# Patient Record
Sex: FEMALE | Race: ASIAN | NOT HISPANIC OR LATINO | URBAN - METROPOLITAN AREA
[De-identification: names, ages, dates, MRNs, and addresses within clinical notes are randomized per-mention and may not be internally consistent; named-entity substitution may affect disease eponyms.]

---

## 2017-02-03 ENCOUNTER — INPATIENT (INPATIENT)
Facility: HOSPITAL | Age: 52
LOS: 14 days | Discharge: NURSING, REHAB OR LONG-TERM CARE | DRG: 64 | End: 2017-02-18
Attending: PSYCHIATRY & NEUROLOGY | Admitting: PSYCHIATRY & NEUROLOGY
Payer: MEDICAID

## 2017-02-03 VITALS
SYSTOLIC BLOOD PRESSURE: 130 MMHG | OXYGEN SATURATION: 100 % | HEART RATE: 80 BPM | RESPIRATION RATE: 18 BRPM | DIASTOLIC BLOOD PRESSURE: 60 MMHG

## 2017-02-03 DIAGNOSIS — I62.9 NONTRAUMATIC INTRACRANIAL HEMORRHAGE, UNSPECIFIED: ICD-10-CM

## 2017-02-03 LAB
APPEARANCE UR: CLEAR — SIGNIFICANT CHANGE UP
APTT BLD: 30.6 SEC — SIGNIFICANT CHANGE UP (ref 27.5–37.4)
BASOPHILS # BLD AUTO: 0 K/UL — SIGNIFICANT CHANGE UP (ref 0–0.2)
BASOPHILS NFR BLD AUTO: 0.1 % — SIGNIFICANT CHANGE UP (ref 0–2)
BILIRUB UR-MCNC: NEGATIVE — SIGNIFICANT CHANGE UP
COLOR SPEC: SIGNIFICANT CHANGE UP
DIFF PNL FLD: NEGATIVE — SIGNIFICANT CHANGE UP
EOSINOPHIL # BLD AUTO: 0 K/UL — SIGNIFICANT CHANGE UP (ref 0–0.5)
EOSINOPHIL NFR BLD AUTO: 0.3 % — SIGNIFICANT CHANGE UP (ref 0–6)
EPI CELLS # UR: SIGNIFICANT CHANGE UP /HPF
GAS PNL BLDV: SIGNIFICANT CHANGE UP
GLUCOSE UR QL: NEGATIVE — SIGNIFICANT CHANGE UP
HCT VFR BLD CALC: 43.4 % — SIGNIFICANT CHANGE UP (ref 34.5–45)
HGB BLD-MCNC: 14.9 G/DL — SIGNIFICANT CHANGE UP (ref 11.5–15.5)
INR BLD: 0.94 RATIO — SIGNIFICANT CHANGE UP (ref 0.88–1.16)
KETONES UR-MCNC: NEGATIVE — SIGNIFICANT CHANGE UP
LEUKOCYTE ESTERASE UR-ACNC: NEGATIVE — SIGNIFICANT CHANGE UP
LYMPHOCYTES # BLD AUTO: 1 K/UL — SIGNIFICANT CHANGE UP (ref 1–3.3)
LYMPHOCYTES # BLD AUTO: 7.9 % — LOW (ref 13–44)
MCHC RBC-ENTMCNC: 32.8 PG — SIGNIFICANT CHANGE UP (ref 27–34)
MCHC RBC-ENTMCNC: 34.4 GM/DL — SIGNIFICANT CHANGE UP (ref 32–36)
MCV RBC AUTO: 95.2 FL — SIGNIFICANT CHANGE UP (ref 80–100)
MONOCYTES # BLD AUTO: 0.4 K/UL — SIGNIFICANT CHANGE UP (ref 0–0.9)
MONOCYTES NFR BLD AUTO: 3.5 % — SIGNIFICANT CHANGE UP (ref 2–14)
NEUTROPHILS # BLD AUTO: 10.9 K/UL — HIGH (ref 1.8–7.4)
NEUTROPHILS NFR BLD AUTO: 88.2 % — HIGH (ref 43–77)
NITRITE UR-MCNC: NEGATIVE — SIGNIFICANT CHANGE UP
PH UR: 7.5 — SIGNIFICANT CHANGE UP (ref 4.8–8)
PLATELET # BLD AUTO: 145 K/UL — LOW (ref 150–400)
PROT UR-MCNC: SIGNIFICANT CHANGE UP
PROTHROM AB SERPL-ACNC: 10.2 SEC — SIGNIFICANT CHANGE UP (ref 10–13.1)
RBC # BLD: 4.56 M/UL — SIGNIFICANT CHANGE UP (ref 3.8–5.2)
RBC # FLD: 12.1 % — SIGNIFICANT CHANGE UP (ref 10.3–14.5)
RBC CASTS # UR COMP ASSIST: SIGNIFICANT CHANGE UP /HPF (ref 0–2)
SP GR SPEC: 1.01 — SIGNIFICANT CHANGE UP (ref 1.01–1.02)
UROBILINOGEN FLD QL: NEGATIVE — SIGNIFICANT CHANGE UP
WBC # BLD: 12.4 K/UL — HIGH (ref 3.8–10.5)
WBC # FLD AUTO: 12.4 K/UL — HIGH (ref 3.8–10.5)
WBC UR QL: SIGNIFICANT CHANGE UP /HPF (ref 0–5)

## 2017-02-03 PROCEDURE — 99291 CRITICAL CARE FIRST HOUR: CPT | Mod: 25

## 2017-02-03 PROCEDURE — 70496 CT ANGIOGRAPHY HEAD: CPT | Mod: 26

## 2017-02-03 PROCEDURE — 93010 ELECTROCARDIOGRAM REPORT: CPT

## 2017-02-03 RX ORDER — LEVETIRACETAM 250 MG/1
1000 TABLET, FILM COATED ORAL ONCE
Qty: 0 | Refills: 0 | Status: COMPLETED | OUTPATIENT
Start: 2017-02-03 | End: 2017-02-03

## 2017-02-03 RX ORDER — ONDANSETRON 8 MG/1
4 TABLET, FILM COATED ORAL ONCE
Qty: 0 | Refills: 0 | Status: COMPLETED | OUTPATIENT
Start: 2017-02-03 | End: 2017-02-03

## 2017-02-03 RX ORDER — ATORVASTATIN CALCIUM 80 MG/1
80 TABLET, FILM COATED ORAL AT BEDTIME
Qty: 0 | Refills: 0 | Status: DISCONTINUED | OUTPATIENT
Start: 2017-02-03 | End: 2017-02-03

## 2017-02-03 RX ORDER — NICARDIPINE HYDROCHLORIDE 30 MG/1
5 CAPSULE, EXTENDED RELEASE ORAL
Qty: 50 | Refills: 0 | Status: DISCONTINUED | OUTPATIENT
Start: 2017-02-03 | End: 2017-02-04

## 2017-02-03 RX ADMIN — ONDANSETRON 4 MILLIGRAM(S): 8 TABLET, FILM COATED ORAL at 18:45

## 2017-02-03 RX ADMIN — LEVETIRACETAM 400 MILLIGRAM(S): 250 TABLET, FILM COATED ORAL at 16:36

## 2017-02-03 RX ADMIN — NICARDIPINE HYDROCHLORIDE 25 MG/HR: 30 CAPSULE, EXTENDED RELEASE ORAL at 16:36

## 2017-02-03 NOTE — ED PROVIDER NOTE - CRITICAL CARE PROVIDED
consultation with other physicians/interpretation of diagnostic studies/direct patient care (not related to procedure)/additional history taking/documentation/consult w/ pt's family directly relating to pts condition

## 2017-02-03 NOTE — ED ADULT NURSE REASSESSMENT NOTE - NS ED NURSE REASSESS COMMENT FT1
Pt attempting to climb out of bed / disorientated. MD aware. Pt placed on 1;1 to maintain pts safety.

## 2017-02-03 NOTE — H&P ADULT. - HISTORY OF PRESENT ILLNESS
50 yearold woman no PMH transferred from [facility] with a Left basal ganglia hemorrhage. As per coworker at bedside, patient came to work today and was found to have slurred speech and aphasic. when she arrived at [facility] she was found to have Rt sided weakness. Was placed on a cardiene drip for SBP >200. No recent trauma reported. Coworker states that patient normally speaks fluent English but is now not making sense. NIHSS-15, MRS-0, ICH score on admission- 1    ROS- negative except above.

## 2017-02-03 NOTE — ED ADULT NURSE NOTE - OBJECTIVE STATEMENT
51 y.o female transfer from [place] c L temporal hematoma. Pt last seen normal last night- went to dinner c work colleagues. Showed up to work this morning when slurred speech was noticed as per EMS. Pt is bilingual as per pts work colleagues. Pt has incoherent speech upon interview. Pupils +6 B/L reactive. PADILLA- responds to painful stimuli. 51 y.o female transfer from [place] c L temporal hematoma. Pt last seen normal last night- went to dinner c work colleagues. Showed up to work this morning when slurred speech was noticed as per EMS. Pt is bilingual as per pts work colleagues. Pt has incoherent speech upon interview. Pupils +6 B/L reactive. PADILLA- responds to painful stimuli. Pt arrived c Cardene gtt infusing- 5mg/hr. Pt had arrived to [place] ED c systolic 200+. No facial dropp noted. Pt smiles when asked. Work collegues at bedside. Skin intact. No vomiting at this time. Cardene to be continued as per MDs orders. Neuro surg paged/ at bedside. 51 y.o female transfer from [place] c "L temporal hematoma". Pt last seen normal last night- went to dinner c work colleagues. Showed up to work this morning when slurred speech was noticed as per EMS. Pt is bilingual as per pts work colleagues. Pt alert c incoherent speech upon interview. Pupils +6 B/L reactive. PADILLA- responds to painful stimuli. Pt arrived c Cardene gtt infusing- 5mg/hr. Pt had arrived to [place] ED c systolic 200+. No facial droop noted. Pt smiles when asked. Work colleagues at bedside. Skin intact. No vomiting at this time. Cardene to be continued as per MDs orders. Neuro surg paged/ at bedside.

## 2017-02-03 NOTE — ED PROVIDER NOTE - OBJECTIVE STATEMENT
51 no known PMH transferred from [facility] for L temporal hematoma. Patient was with coworkers last night for dinner and then arrived to work today with slurred speech. When she arrived to the hospital she had R sided deficit and was aphasic. Placed on a cardene drip for SBP >200. Upon arrival patient aphasic, R sided deficits, but eye open and alert.

## 2017-02-03 NOTE — H&P ADULT. - ATTENDING COMMENTS
Ms. Hartman is a 51 year old woman no known PMH transferred from [facility] with a Left basal ganglia hemorrhage. As per coworker at bedside, patient came to work today and was found to have slurred speech  and was not making sense. When she arrived at [facility] she was found to have Rt sided weakness. Was placed on a cardiene drip for SBP >200. No recent trauma reported. Coworker states that patient normally speaks fluent English but is now not making sense. NIHSS-15, MRS-0, ICH score on admission- 1  on neurological exam she has significant expressive aphasia she is able to follow simple commands, she makes several paraphasic errors speech is fluent however nonsensical. on motor exam she has right hemiparesis facial droop  Impression: Nontraumatic left hemispheric basal ganglia hemorrhage, unclear etiology ? Hypertension; however needs complete workup including MRI brain with and without contrast to rule out vascular lesions.

## 2017-02-03 NOTE — ED ADULT NURSE NOTE - ED STAT RN HANDOFF DETAILS
Report given to Jenna. Pt awaiting bed assignment on stroke unit. Remains on 1:1 to maintain safety.

## 2017-02-03 NOTE — H&P ADULT. - MOTOR
0/5 RUE, 5/5 LUE, Moves LE spontaneously but cannot assess for drift (patient not cooperating with exam)

## 2017-02-03 NOTE — ED PROVIDER NOTE - MEDICAL DECISION MAKING DETAILS
Jayden: 52 yo F with no PMHx transferred from [facility] for L temporal hematoma p/w slurred speech (expressive aphasia), RUE and RLE hemiparesis and confusion. Pt sent to [facility] on cardene drip.   -monitor, neuro checks q1 hr, ecg, labs, CTA head, stat NS and neuro consult, admission

## 2017-02-03 NOTE — H&P ADULT. - ASSESSMENT
52 yearold woman no PMH transferred from [facility] for left sided ICH. Was found to have SBP >200 in OSH, was put on cardiene drip. Most likely from hypertensive urgency

## 2017-02-03 NOTE — H&P ADULT. - PROBLEM SELECTOR PLAN 1
[]CTA H/N  []MRI brain w and w/o con  []Neuro checks q2 hours  []repeat CTH in AM  []MAP <130, SBP<140 []CTA H/N  []MRI brain w and w/o con  []Neuro checks q2 hours  []NPO; dysphagia screening  []PT/OT/SS  []repeat CTH in AM  []MAP <130, SBP<140

## 2017-02-04 LAB
ANION GAP SERPL CALC-SCNC: 15 MMOL/L — SIGNIFICANT CHANGE UP (ref 5–17)
BUN SERPL-MCNC: 8 MG/DL — SIGNIFICANT CHANGE UP (ref 7–23)
CALCIUM SERPL-MCNC: 9.3 MG/DL — SIGNIFICANT CHANGE UP (ref 8.4–10.5)
CHLORIDE SERPL-SCNC: 101 MMOL/L — SIGNIFICANT CHANGE UP (ref 96–108)
CHOLEST SERPL-MCNC: 212 MG/DL — HIGH (ref 10–199)
CO2 SERPL-SCNC: 21 MMOL/L — LOW (ref 22–31)
CREAT SERPL-MCNC: 0.52 MG/DL — SIGNIFICANT CHANGE UP (ref 0.5–1.3)
GLUCOSE SERPL-MCNC: 111 MG/DL — HIGH (ref 70–99)
HBA1C BLD-MCNC: 5.4 % — SIGNIFICANT CHANGE UP (ref 4–5.6)
HCT VFR BLD CALC: 41.7 % — SIGNIFICANT CHANGE UP (ref 34.5–45)
HDLC SERPL-MCNC: 83 MG/DL — SIGNIFICANT CHANGE UP (ref 40–125)
HGB BLD-MCNC: 14.2 G/DL — SIGNIFICANT CHANGE UP (ref 11.5–15.5)
LIPID PNL WITH DIRECT LDL SERPL: 121 MG/DL — SIGNIFICANT CHANGE UP
MCHC RBC-ENTMCNC: 32.6 PG — SIGNIFICANT CHANGE UP (ref 27–34)
MCHC RBC-ENTMCNC: 34.1 GM/DL — SIGNIFICANT CHANGE UP (ref 32–36)
MCV RBC AUTO: 95.4 FL — SIGNIFICANT CHANGE UP (ref 80–100)
PLATELET # BLD AUTO: 158 K/UL — SIGNIFICANT CHANGE UP (ref 150–400)
POTASSIUM SERPL-MCNC: 3.7 MMOL/L — SIGNIFICANT CHANGE UP (ref 3.5–5.3)
POTASSIUM SERPL-SCNC: 3.7 MMOL/L — SIGNIFICANT CHANGE UP (ref 3.5–5.3)
RBC # BLD: 4.37 M/UL — SIGNIFICANT CHANGE UP (ref 3.8–5.2)
RBC # FLD: 12.1 % — SIGNIFICANT CHANGE UP (ref 10.3–14.5)
SODIUM SERPL-SCNC: 137 MMOL/L — SIGNIFICANT CHANGE UP (ref 135–145)
TOTAL CHOLESTEROL/HDL RATIO MEASUREMENT: 2.6 RATIO — LOW (ref 3.3–7.1)
TRIGL SERPL-MCNC: 42 MG/DL — SIGNIFICANT CHANGE UP (ref 10–149)
WBC # BLD: 12.2 K/UL — HIGH (ref 3.8–10.5)
WBC # FLD AUTO: 12.2 K/UL — HIGH (ref 3.8–10.5)

## 2017-02-04 PROCEDURE — 93306 TTE W/DOPPLER COMPLETE: CPT | Mod: 26

## 2017-02-04 PROCEDURE — 70551 MRI BRAIN STEM W/O DYE: CPT | Mod: 26

## 2017-02-04 PROCEDURE — 99223 1ST HOSP IP/OBS HIGH 75: CPT

## 2017-02-04 PROCEDURE — 70450 CT HEAD/BRAIN W/O DYE: CPT | Mod: 26

## 2017-02-04 RX ORDER — HYDRALAZINE HCL 50 MG
5 TABLET ORAL EVERY 8 HOURS
Qty: 0 | Refills: 0 | Status: DISCONTINUED | OUTPATIENT
Start: 2017-02-04 | End: 2017-02-06

## 2017-02-04 NOTE — DISCHARGE NOTE ADULT - PATIENT PORTAL LINK FT
“You can access the FollowHealth Patient Portal, offered by [facility], by registering with the following website: [email or web address]”

## 2017-02-04 NOTE — PHYSICAL THERAPY INITIAL EVALUATION ADULT - FOLLOWS COMMANDS/ANSWERS QUESTIONS, REHAB EVAL
follows demonstration better than verbal commands/50% of the time/able to follow single-step instructions

## 2017-02-04 NOTE — PATIENT PROFILE ADULT. - LANGUAGE ASSISTANCE NEEDED
pt speaks and understand English/No-Patient/Caregiver offered and refused free interpretation services.

## 2017-02-04 NOTE — DISCHARGE NOTE ADULT - CARE PROVIDERS DIRECT ADDRESSES
[email or web address]

## 2017-02-04 NOTE — DISCHARGE NOTE ADULT - CARE PLAN
Principal Discharge DX:	Intracranial hemorrhage  Goal:	prevention  Instructions for follow-up, activity and diet:	C/w current medications

## 2017-02-04 NOTE — DISCHARGE NOTE ADULT - OTHER SIGNIFICANT FINDINGS
There is redemonstration of a superior left temporal/inferior left   frontoparietal intraparenchymal hemorrhage measuring 4.3 x 3.6 cm. There   is surrounding vasogenic edema with mass effect upon the lateral   ventricle and left to right shift of 3 mm. No intraventricular hemorrhage   is identified. There is no hydrocephalus. There is no acute infarct.    Flow voids of the major intracranial vessels at the skull base follow   expected course and contour.    There is a right maxillary polyp/retention cyst with near complete   opacification of the right maxillary sinus. The orbits, sellar and   suprasellar structures, and craniocervical junction are unremarkable.

## 2017-02-04 NOTE — DISCHARGE NOTE ADULT - HOSPITAL COURSE
50 yearold woman no PMH transferred from [facility] with a Left basal ganglia hemorrhage. As per coworker at bedside, patient came to work today and was found to have slurred speech and aphasic. when she arrived at [facility] she was found to have Rt sided weakness. Was placed on a cardiene drip for SBP >200  Patient was admitted to the stroke unit for observation and work up. Work up included MRI Brain demonstrating eft inferior frontotemporal parietal/superior   temporal intraparenchymal hemorrhage with surrounding vasogenic edema and   3 mm of left-to-right midline shift.  , vessel imaging via MRA head/neck demonstrating no stenosis . Etiology of patient's symptoms of weakness  were secondary to L hemispheric hemmorhage likely 2/2 uncontrolled hypertension . Patient underwent blood pressure management, preventative care, and physical therapy recommending rehab during course of stay. Patient is now stable for follow up as outpatient in 2-4 weeks with Neurology Patient is to continue medications as directed for stroke prevention. 52 yearold woman no PMH transferred from [facility] with a Left basal ganglia hemorrhage. As per coworker at bedside, patient came to work today and was found to have slurred speech and aphasic. when she arrived at [facility] she was found to have Rt sided weakness. Was placed on a cardiene drip for SBP >200  Patient was admitted to the stroke unit for observation and work up. Work up included MRI Brain demonstrating eft inferior frontotemporal parietal/superior   temporal intraparenchymal hemorrhage with surrounding vasogenic edema and   3 mm of left-to-right midline shift. vessel imaging via MRA head/neck demonstrating no stenosis . Etiology of patient's symptoms of weakness  were secondary to L hemispheric hemmorhage likely 2/2 uncontrolled hypertension . Patient underwent blood pressure management, preventative care, and physical therapy recommending rehab during course of stay. Patient is now stable for follow up as outpatient in 2-4 weeks with Neurology Patient is to continue medications as directed for stroke prevention.

## 2017-02-04 NOTE — DISCHARGE NOTE ADULT - NS AS DC STROKE ED MATERIALS
Stroke Education Booklet/Risk Factors for Stroke/Prescribed Medications/Need for Followup After Discharge/Stroke Warning Signs and Symptoms/Call 911 for Stroke

## 2017-02-04 NOTE — DISCHARGE NOTE ADULT - MEDICATION SUMMARY - MEDICATIONS TO TAKE
I will START or STAY ON the medications listed below when I get home from the hospital:    lisinopril 5 mg oral tablet  -- 1 tab(s) by mouth once a day  -- Indication: For hypertension    metoprolol  -- 12.5 milligram(s) by mouth 2 times a day  -- Indication: For hypertension

## 2017-02-04 NOTE — PHYSICAL THERAPY INITIAL EVALUATION ADULT - PERTINENT HX OF CURRENT PROBLEM, REHAB EVAL
50 y/o F no PMH transferred from [facility] with a Left basal ganglia hemorrhage. As per coworker at bedside, patient came to work today and was found to have slurred speech and aphasia. NIHSS-15, MRS-0, ICH score on admission- 1.  CT angio: Left frontotemporal intraparenchymal hemorrhage with extension into the **cont below:

## 2017-02-04 NOTE — DISCHARGE NOTE ADULT - CARE PROVIDER_API CALL
Nick Zavala (ESE), Neurology; Vascular Neurology  [address] [place] [address]  Phone: [phone]  Fax: [phone]

## 2017-02-04 NOTE — PHYSICAL THERAPY INITIAL EVALUATION ADULT - ADDITIONAL COMMENTS
**cont from above: left posterior limb of the internal capsule, left subinsular cortex, and left centrum semiovale measuring approximately 4 x 3.9 x 3.9 cm, with surrounding edema and mass effect, and effacement of the left lateral third ventricle with left-to-right midline shift of 5 mm.    Pt lives alone in a house, no steps to negotiate.  Pt was independent with all functional mobility prior to admission, pt is a .  Pt friend at bedside pt does not have family here but has good support system from friends and co-workers.

## 2017-02-05 LAB
ANION GAP SERPL CALC-SCNC: 18 MMOL/L — HIGH (ref 5–17)
BUN SERPL-MCNC: 21 MG/DL — SIGNIFICANT CHANGE UP (ref 7–23)
CALCIUM SERPL-MCNC: 9.9 MG/DL — SIGNIFICANT CHANGE UP (ref 8.4–10.5)
CHLORIDE SERPL-SCNC: 101 MMOL/L — SIGNIFICANT CHANGE UP (ref 96–108)
CO2 SERPL-SCNC: 21 MMOL/L — LOW (ref 22–31)
CREAT SERPL-MCNC: 0.65 MG/DL — SIGNIFICANT CHANGE UP (ref 0.5–1.3)
GLUCOSE SERPL-MCNC: 94 MG/DL — SIGNIFICANT CHANGE UP (ref 70–99)
HCT VFR BLD CALC: 42.3 % — SIGNIFICANT CHANGE UP (ref 34.5–45)
HGB BLD-MCNC: 14.6 G/DL — SIGNIFICANT CHANGE UP (ref 11.5–15.5)
MCHC RBC-ENTMCNC: 33.1 PG — SIGNIFICANT CHANGE UP (ref 27–34)
MCHC RBC-ENTMCNC: 34.6 GM/DL — SIGNIFICANT CHANGE UP (ref 32–36)
MCV RBC AUTO: 95.8 FL — SIGNIFICANT CHANGE UP (ref 80–100)
PLATELET # BLD AUTO: 169 K/UL — SIGNIFICANT CHANGE UP (ref 150–400)
POTASSIUM SERPL-MCNC: 3.3 MMOL/L — LOW (ref 3.5–5.3)
POTASSIUM SERPL-SCNC: 3.3 MMOL/L — LOW (ref 3.5–5.3)
RBC # BLD: 4.42 M/UL — SIGNIFICANT CHANGE UP (ref 3.8–5.2)
RBC # FLD: 11.6 % — SIGNIFICANT CHANGE UP (ref 10.3–14.5)
SODIUM SERPL-SCNC: 140 MMOL/L — SIGNIFICANT CHANGE UP (ref 135–145)
WBC # BLD: 11.2 K/UL — HIGH (ref 3.8–10.5)
WBC # FLD AUTO: 11.2 K/UL — HIGH (ref 3.8–10.5)

## 2017-02-05 PROCEDURE — 99233 SBSQ HOSP IP/OBS HIGH 50: CPT

## 2017-02-05 PROCEDURE — 70553 MRI BRAIN STEM W/O & W/DYE: CPT | Mod: 26

## 2017-02-05 RX ORDER — METOPROLOL TARTRATE 50 MG
5 TABLET ORAL EVERY 8 HOURS
Qty: 0 | Refills: 0 | Status: DISCONTINUED | OUTPATIENT
Start: 2017-02-05 | End: 2017-02-06

## 2017-02-05 RX ORDER — ENOXAPARIN SODIUM 100 MG/ML
40 INJECTION SUBCUTANEOUS DAILY
Qty: 0 | Refills: 0 | Status: DISCONTINUED | OUTPATIENT
Start: 2017-02-05 | End: 2017-02-18

## 2017-02-05 RX ORDER — POTASSIUM CHLORIDE 20 MEQ
10 PACKET (EA) ORAL
Qty: 0 | Refills: 0 | Status: COMPLETED | OUTPATIENT
Start: 2017-02-05 | End: 2017-02-05

## 2017-02-05 RX ADMIN — Medication 100 MILLIEQUIVALENT(S): at 06:54

## 2017-02-05 RX ADMIN — Medication 5 MILLIGRAM(S): at 21:58

## 2017-02-05 RX ADMIN — Medication 100 MILLIEQUIVALENT(S): at 10:05

## 2017-02-05 RX ADMIN — Medication 100 MILLIEQUIVALENT(S): at 08:00

## 2017-02-05 RX ADMIN — Medication 5 MILLIGRAM(S): at 03:15

## 2017-02-05 RX ADMIN — ENOXAPARIN SODIUM 40 MILLIGRAM(S): 100 INJECTION SUBCUTANEOUS at 12:58

## 2017-02-05 RX ADMIN — Medication 5 MILLIGRAM(S): at 14:20

## 2017-02-05 NOTE — SPEECH LANGUAGE PATHOLOGY EVALUATION - SLP EXECUTIVE FUNCTION DEFICITS NOTED
planning/insight/awareness/self-correction/self-monitoring/impulsivity/mental flexibility/organization/inhibition

## 2017-02-05 NOTE — SWALLOW BEDSIDE ASSESSMENT ADULT - SWALLOW EVAL: DIAGNOSIS
Pt presents with oropharyngeal dysphagia primarily characterized by s/s of aspiration on thin liquids. Pt also presents with cognitive linguistic deficits.   Solids not presented given reduced oral awareness formation, control and transfer of the bolus.  Right sided facial weakness noted with mildly reduced labial/lingual ROM and strength.  Pt presents with fluent aphasia in both Mandarin/English, intermixed with jargon.

## 2017-02-05 NOTE — SPEECH LANGUAGE PATHOLOGY EVALUATION - SLP DIAGNOSIS
Pt presents with fluent aphasia characterized by jargon intermixed with true word as Pt switches between English/Mandarin.  Deficits noted in receptive/expressive language skills in addition to executive function/underling cognitive deficits.  Vocal intensity is reduced with increased rate of speech resulting in poor to fair speech intelligibility when true word/phrase is produced. Pt presents with fluent aphasia characterized by jargon intermixed with true word as Pt switches between English/Mandarin.  Deficits noted in receptive/expressive language skills in addition to executive function/underling cognitive deficits.  Vocal intensity is reduced.  In addition, dysarthria characterized by increased rate of speech resulting in poor to fair speech intelligibility when true word/phrase is produced.  Pt gestures spontaneously but does not consistently answer y/n questions or follow commands.  Pt is easily distracted with reduced attention to speaker. Pt presents with fluent aphasia characterized by jargon intermixed with true word as Pt appears to switch between English/Mandarin languages.  Deficits noted in receptive/expressive language skills in addition to executive function/underling cognitive deficits.  Vocal intensity is reduced.  Dysarthria characterized by poor to fair speech intelligibility when true word/phrase is produced.  Pt gestures spontaneously but does not consistently answer y/n questions or follow commands.  Pt is easily distracted with reduced attention to speaker. Pt presents with fluent aphasia characterized by jargon intermixed with true word as Pt appears to switch between English/Mandarin languages.  Deficits noted in receptive/expressive language skills in addition to executive function/underling cognitive deficits suspected.  Vocal intensity is reduced.  Dysarthria characterized by poor to fair speech intelligibility when true word/phrase is produced.  Pt gestures spontaneously but does not consistently answer y/n questions or follow commands.  Pt is easily distracted with reduced attention to speaker.

## 2017-02-05 NOTE — SWALLOW BEDSIDE ASSESSMENT ADULT - CONSISTENCIES ADMINISTERED
nectar thick/puree thin/puree thick thin liquid solids not administered given reduced oral awareness and weakness (R>L)

## 2017-02-05 NOTE — SPEECH LANGUAGE PATHOLOGY EVALUATION - SLP FLUENCY
Pt's speech is fluent although aphasic errors/jargon noted as Pt speaks while interchanging English/Mandarin languages.  Pt's friend at b/s served as  and had frequent difficulty discriminating utterances.  To this clinician utterances sounded like Mandarin vs jargon.

## 2017-02-05 NOTE — OCCUPATIONAL THERAPY INITIAL EVALUATION ADULT - GENERAL OBSERVATIONS, REHAB EVAL
Pt received supine in bed, +cardiac monitor, +pulse ox, +alert however pt continuously going back and forth btwn Mandarin and English therefore not oriented to situation . Friends at bedside assist with translation

## 2017-02-05 NOTE — OCCUPATIONAL THERAPY INITIAL EVALUATION ADULT - MANUAL MUSCLE TESTING RESULTS, REHAB EVAL
LUE: 3/5, LLE: 3/5, RUE: proximal to distal shoulder to wrist 2/5, unable to extend wrist, RLE: hip and knee 3/5, R ankle 2/5 unable to perform plantarflexion/grossly assessed due to

## 2017-02-05 NOTE — OCCUPATIONAL THERAPY INITIAL EVALUATION ADULT - ORIENTATION, REHAB EVAL
person/pt able to provide 1st name and mos of birth however pt continuously transferring back and forth between English and Mandarin therefore max verbal cues required to assess

## 2017-02-05 NOTE — OCCUPATIONAL THERAPY INITIAL EVALUATION ADULT - LIVES WITH, PROFILE
alone/Pt lives alone in 1st floor apartment, +2-3 steps to enter with b/l handrails, bedroom and tub shower located on one level. As per friends, pt does not have family in the [place]

## 2017-02-05 NOTE — SPEECH LANGUAGE PATHOLOGY EVALUATION - COMMENTS
NIHSS-15, MRS-0, ICH score on admission- 1; on neurological exam she has significant expressive aphasia she is able to follow simple commands, she makes several paraphasic errors speech is fluent however nonsensical. on motor exam she has right hemiparesis facial droop.  2/4/17 CT Head: IMPRESSION: No significant change in the left cerebral hemisphere intraparenchymal hemorrhage with surrounding edema and mass effect resulting in a stable rightward midline shift.  MRI IMPRESSION: Redemonstration of left inferior frontotemporal parietal/superior temporal intraparenchymal hemorrhage with surrounding vasogenic edema and 3 mm of left-to-right midline shift. Impression: Nontraumatic left hemispheric basal ganglia hemorrhage, unclear etiology ? Hypertension; however needs complete workup including MRI brain with and without contrast to rule out vascular lesions.. Pt is easily distracted with reduced attention to speaker and reduced awareness of communication breakdown.  Pt offered  phone for Mandarin but shook head no and pointed to friend at bedside; in addition Nsg reports unsuccessful attempts at use of  phone for Mandarin as the  was unable to hear Pt; Pt speaks with low vocal intensity; +dysarthria further impacts speech intelligibility; +jargon. Pt verbalized social greeting and was able to state profession/name with moderate cueing.  Pt evidenced significant difficulty in object naming task, requiring f=2 words to improve responses.  Pt gestured to demonstrate desire to drink although verbalizations were frequently unintelligible.  Pt stated pudding was "good". Pt responded to question (re: hunger) appropriately.  Pt followed some commands with visual cues.  Repetition of verbal stimuli and/or translation to Mandarin appeared to improve response to y/n questions.   Reduced attention and distractibility reduced functional communication skills. n/a Pt often perseverated gestures (ie pointing to right side of body) and required max cues to redirect to the task. Pt is easily distracted with reduced attention to speaker and reduced awareness of communication breakdown.  Pt offered  phone for Mandarin but shook head no and pointed to friend at bedside; in addition Nsg reports unsuccessful attempts at use of  phone for Mandarin as the  was unable to hear Pt; Pt speaks with low vocal intensity; +dysarthria further impacts speech intelligibility; +jargon with production of occasional true word/phrase.

## 2017-02-05 NOTE — SWALLOW BEDSIDE ASSESSMENT ADULT - PHARYNGEAL PHASE
No signs or symptoms of laryngeal penetration/aspiration evident with any consistency presented.  Pharyngeal swallow judged to be timely with adequate laryngeal elevation. Delayed cough post oral intake

## 2017-02-05 NOTE — OCCUPATIONAL THERAPY INITIAL EVALUATION ADULT - RANGE OF MOTION EXAMINATION, LOWER EXTREMITY
Right LE Active ROM was WFL   (within functional limits)/Left LE Active ROM was WFL (within functional limits)/R ankle PROM WFL

## 2017-02-05 NOTE — OCCUPATIONAL THERAPY INITIAL EVALUATION ADULT - ADDITIONAL COMMENTS
CT Brain 2/4: No significant change in the left cerebral hemisphere intraparenchymal hemorrhage with surrounding edema and mass effect resulting in a stable rightward midline shift. Brain MRI 2/4: Redemonstration of left inferior frontotemporal parietal/superior temporal intraparenchymal hemorrhage with surrounding vasogenic edema and 3 mm of left-to-right midline shift.   TTE 2/4/17:Normal aortic root size. (Ao: 3.3 cm at the sinuses of Valsalva). Dilated ascending aorta (4.4 cm).Moderate concentric left ventricular hypertrophy.Endocardium not well visualized; grossly normal left ventricular systolic function.Reversal of the E-A waves of the mitral inflow pattern consistent with reduced compliance of the left ventricle.The right ventricle is not well visualized; grossly normal right ventricular systolic function.No pericardial effusion seen.

## 2017-02-05 NOTE — OCCUPATIONAL THERAPY INITIAL EVALUATION ADULT - PERTINENT HX OF CURRENT PROBLEM, REHAB EVAL
52 yo F no PMH transferred from [facility] with a L basal ganglia hemorrhage. As per coworker at bedside, pt came to work today and found to have slurred speech and aphasic. when she arrived at [facility] she was found to have Rt sided weakness. Was placed on a cardiene drip for SBP >200. No recent trauma reported. Coworker states that patient normally speaks fluent English but is now not making sense. NIHSS-15, MRS-0, ICH score on admission- 1. ROS- negative except above

## 2017-02-05 NOTE — OCCUPATIONAL THERAPY INITIAL EVALUATION ADULT - PLANNED THERAPY INTERVENTIONS, OT EVAL
bed mobility training/fine motor coordination training/IADL retraining/ROM/neuromuscular re-education/ADL retraining/balance training/strengthening/transfer training/cognitive, visual perceptual/motor coordination training

## 2017-02-05 NOTE — OCCUPATIONAL THERAPY INITIAL EVALUATION ADULT - RANGE OF MOTION EXAMINATION, UPPER EXTREMITY
Left UE Active ROM was WFL (within functional limits)/RUE AROM shoulder flex:0-45, PROM WFL, R elbow AROM WFL, R wrist PROM WFL (ROM performed in supine)

## 2017-02-05 NOTE — SPEECH LANGUAGE PATHOLOGY EVALUATION - SLP PERTINENT HISTORY OF CURRENT PROBLEM
Ms. Hartman is a 51 year old woman no known PMH transferred from [facility] with a Left basal ganglia hemorrhage. As per coworker at bedside, patient came to work today and was found to have slurred speech  and was not making sense. When she arrived at [facility] she was found to have Rt sided weakness. Was placed on a cardiene drip for SBP >200. No recent trauma reported. Coworker states that patient normally speaks fluent English but is now not making sense.

## 2017-02-05 NOTE — OCCUPATIONAL THERAPY INITIAL EVALUATION ADULT - NS ASR FOLLOW COMMAND OT EVAL
required max verbal cues and visual demos 2* to language/able to follow single-step instructions/50% of the time

## 2017-02-05 NOTE — SWALLOW BEDSIDE ASSESSMENT ADULT - ORAL PREPARATORY PHASE
reduced oral awareness to residue on Right labial margin with purees; Pt impulsive with self feeding./Within functional limits suspect a-p spillage/Reduced oral grading

## 2017-02-06 LAB
ANION GAP SERPL CALC-SCNC: 13 MMOL/L — SIGNIFICANT CHANGE UP (ref 5–17)
BUN SERPL-MCNC: 15 MG/DL — SIGNIFICANT CHANGE UP (ref 7–23)
CALCIUM SERPL-MCNC: 9.1 MG/DL — SIGNIFICANT CHANGE UP (ref 8.4–10.5)
CHLORIDE SERPL-SCNC: 101 MMOL/L — SIGNIFICANT CHANGE UP (ref 96–108)
CO2 SERPL-SCNC: 22 MMOL/L — SIGNIFICANT CHANGE UP (ref 22–31)
CREAT SERPL-MCNC: 0.6 MG/DL — SIGNIFICANT CHANGE UP (ref 0.5–1.3)
GLUCOSE SERPL-MCNC: 103 MG/DL — HIGH (ref 70–99)
HCT VFR BLD CALC: 40.8 % — SIGNIFICANT CHANGE UP (ref 34.5–45)
HGB BLD-MCNC: 13.7 G/DL — SIGNIFICANT CHANGE UP (ref 11.5–15.5)
MCHC RBC-ENTMCNC: 32.2 PG — SIGNIFICANT CHANGE UP (ref 27–34)
MCHC RBC-ENTMCNC: 33.7 GM/DL — SIGNIFICANT CHANGE UP (ref 32–36)
MCV RBC AUTO: 95.5 FL — SIGNIFICANT CHANGE UP (ref 80–100)
PLATELET # BLD AUTO: 170 K/UL — SIGNIFICANT CHANGE UP (ref 150–400)
POTASSIUM SERPL-MCNC: 4.3 MMOL/L — SIGNIFICANT CHANGE UP (ref 3.5–5.3)
POTASSIUM SERPL-SCNC: 4.3 MMOL/L — SIGNIFICANT CHANGE UP (ref 3.5–5.3)
RBC # BLD: 4.27 M/UL — SIGNIFICANT CHANGE UP (ref 3.8–5.2)
RBC # FLD: 11.5 % — SIGNIFICANT CHANGE UP (ref 10.3–14.5)
SODIUM SERPL-SCNC: 136 MMOL/L — SIGNIFICANT CHANGE UP (ref 135–145)
WBC # BLD: 10.1 K/UL — SIGNIFICANT CHANGE UP (ref 3.8–10.5)
WBC # FLD AUTO: 10.1 K/UL — SIGNIFICANT CHANGE UP (ref 3.8–10.5)

## 2017-02-06 PROCEDURE — 99253 IP/OBS CNSLTJ NEW/EST LOW 45: CPT

## 2017-02-06 RX ORDER — LISINOPRIL 2.5 MG/1
5 TABLET ORAL DAILY
Qty: 0 | Refills: 0 | Status: DISCONTINUED | OUTPATIENT
Start: 2017-02-06 | End: 2017-02-18

## 2017-02-06 RX ORDER — LISINOPRIL 2.5 MG/1
1 TABLET ORAL
Qty: 0 | Refills: 0 | COMMUNITY
Start: 2017-02-06

## 2017-02-06 RX ORDER — METOPROLOL TARTRATE 50 MG
12.5 TABLET ORAL
Qty: 0 | Refills: 0 | Status: DISCONTINUED | OUTPATIENT
Start: 2017-02-06 | End: 2017-02-18

## 2017-02-06 RX ADMIN — Medication 5 MILLIGRAM(S): at 07:38

## 2017-02-06 RX ADMIN — Medication 12.5 MILLIGRAM(S): at 17:33

## 2017-02-06 RX ADMIN — ENOXAPARIN SODIUM 40 MILLIGRAM(S): 100 INJECTION SUBCUTANEOUS at 11:47

## 2017-02-06 RX ADMIN — LISINOPRIL 5 MILLIGRAM(S): 2.5 TABLET ORAL at 09:07

## 2017-02-06 NOTE — DIETITIAN INITIAL EVALUATION ADULT. - PROBLEM SELECTOR PLAN 1
[]CTA H/N  []MRI brain w and w/o con  []Neuro checks q2 hours  []NPO; dysphagia screening  []PT/OT/SS  []repeat CTH in AM  []MAP <130, SBP<140

## 2017-02-06 NOTE — DIETITIAN INITIAL EVALUATION ADULT. - OTHER INFO
Pt seen for: consult for chewing/swallowing difficulty   Adm dx: ICH    PMH: none    GI issues: no N/V/D   Last BM: none since adm   Food allergies: NKFA   Vit/supplement PTA: none noted

## 2017-02-06 NOTE — DIETITIAN INITIAL EVALUATION ADULT. - ENERGY NEEDS
Ht: 65"  Wt: 146.8  BMI:  24.5kg/m2   IBW:  125 (+/-10%)     117%IBW  Edema: none   Skin: no pressure injuries

## 2017-02-07 RX ORDER — METOPROLOL TARTRATE 50 MG
12.5 TABLET ORAL
Qty: 0 | Refills: 0 | COMMUNITY
Start: 2017-02-07

## 2017-02-07 RX ADMIN — ENOXAPARIN SODIUM 40 MILLIGRAM(S): 100 INJECTION SUBCUTANEOUS at 13:32

## 2017-02-07 RX ADMIN — Medication 12.5 MILLIGRAM(S): at 05:34

## 2017-02-07 RX ADMIN — Medication 12.5 MILLIGRAM(S): at 17:55

## 2017-02-07 RX ADMIN — LISINOPRIL 5 MILLIGRAM(S): 2.5 TABLET ORAL at 05:34

## 2017-02-08 RX ADMIN — Medication 12.5 MILLIGRAM(S): at 17:34

## 2017-02-08 RX ADMIN — LISINOPRIL 5 MILLIGRAM(S): 2.5 TABLET ORAL at 05:56

## 2017-02-08 RX ADMIN — ENOXAPARIN SODIUM 40 MILLIGRAM(S): 100 INJECTION SUBCUTANEOUS at 11:29

## 2017-02-08 RX ADMIN — Medication 12.5 MILLIGRAM(S): at 05:56

## 2017-02-08 NOTE — SWALLOW BEDSIDE ASSESSMENT ADULT - ORAL PREPARATORY PHASE
Within functional limits/adequate labial seal impulsive with cues to take small bites and to clear mouth prior to next intake.

## 2017-02-08 NOTE — SWALLOW BEDSIDE ASSESSMENT ADULT - ORAL PHASE
Within functional limits Delayed oral transit time/mild/Within functional limits/Decreased anterior-posterior movement of the bolus

## 2017-02-08 NOTE — SWALLOW BEDSIDE ASSESSMENT ADULT - SWALLOW EVAL: DIAGNOSIS
Pt presents with oropharyngeal dysphagia characterized by reduced oral awareness, mastication of solids and a-p transfer of the bolus.  Pt exhibited coughing post swallow on trials of solids which is deemed to be a s/s of aspiration.  Laryngeal elevation reduced.  No s/s of aspiration evident with thin liquids. Pt presents with oropharyngeal dysphagia characterized by reduced oral awareness, mastication of solids and a-p transfer of the bolus.  Pt exhibited coughing post swallow on trials of solids which is deemed to be a s/s of aspiration.  Laryngeal elevation reduced.  No s/s of aspiration evident with thin liquids.  Right sided facial asymmetry persists.  Speech intelligibility judged to be improving.

## 2017-02-08 NOTE — SWALLOW BEDSIDE ASSESSMENT ADULT - PHARYNGEAL PHASE
No signs or symptoms of laryngeal penetration/aspiration evident with any consistency presented.  Pharyngeal swallow judged to be timely ; clear vocal quality post swallow. Decreased laryngeal elevation/Cough post oral intake

## 2017-02-08 NOTE — SWALLOW BEDSIDE ASSESSMENT ADULT - SLP GENERAL OBSERVATIONS
Pt OOB in chair; aphasia with underlying cognitive deficits persist although Pt is speaking more in English; aphasic errors persist.

## 2017-02-09 RX ADMIN — Medication 12.5 MILLIGRAM(S): at 17:35

## 2017-02-09 RX ADMIN — ENOXAPARIN SODIUM 40 MILLIGRAM(S): 100 INJECTION SUBCUTANEOUS at 12:38

## 2017-02-09 RX ADMIN — Medication 12.5 MILLIGRAM(S): at 05:21

## 2017-02-09 RX ADMIN — LISINOPRIL 5 MILLIGRAM(S): 2.5 TABLET ORAL at 05:21

## 2017-02-10 RX ADMIN — LISINOPRIL 5 MILLIGRAM(S): 2.5 TABLET ORAL at 05:21

## 2017-02-10 RX ADMIN — ENOXAPARIN SODIUM 40 MILLIGRAM(S): 100 INJECTION SUBCUTANEOUS at 12:00

## 2017-02-10 RX ADMIN — Medication 12.5 MILLIGRAM(S): at 05:21

## 2017-02-10 RX ADMIN — Medication 12.5 MILLIGRAM(S): at 18:18

## 2017-02-11 PROCEDURE — 99233 SBSQ HOSP IP/OBS HIGH 50: CPT

## 2017-02-11 RX ADMIN — Medication 12.5 MILLIGRAM(S): at 05:55

## 2017-02-11 RX ADMIN — Medication 12.5 MILLIGRAM(S): at 18:49

## 2017-02-11 RX ADMIN — LISINOPRIL 5 MILLIGRAM(S): 2.5 TABLET ORAL at 05:55

## 2017-02-11 RX ADMIN — ENOXAPARIN SODIUM 40 MILLIGRAM(S): 100 INJECTION SUBCUTANEOUS at 12:12

## 2017-02-12 RX ADMIN — Medication 12.5 MILLIGRAM(S): at 17:01

## 2017-02-12 RX ADMIN — ENOXAPARIN SODIUM 40 MILLIGRAM(S): 100 INJECTION SUBCUTANEOUS at 12:07

## 2017-02-12 RX ADMIN — Medication 12.5 MILLIGRAM(S): at 05:14

## 2017-02-12 RX ADMIN — LISINOPRIL 5 MILLIGRAM(S): 2.5 TABLET ORAL at 05:15

## 2017-02-13 PROCEDURE — 99233 SBSQ HOSP IP/OBS HIGH 50: CPT

## 2017-02-13 RX ADMIN — ENOXAPARIN SODIUM 40 MILLIGRAM(S): 100 INJECTION SUBCUTANEOUS at 12:09

## 2017-02-13 RX ADMIN — Medication 12.5 MILLIGRAM(S): at 05:34

## 2017-02-13 RX ADMIN — Medication 12.5 MILLIGRAM(S): at 17:28

## 2017-02-13 RX ADMIN — LISINOPRIL 5 MILLIGRAM(S): 2.5 TABLET ORAL at 05:34

## 2017-02-13 NOTE — SWALLOW BEDSIDE ASSESSMENT ADULT - ASR SWALLOW LINGUAL MOBILITY
mildly reduced strength
mildly reduced strength
mild/impaired protrusion/impaired anterior elevation/impaired left lateral movement/impaired right lateral movement

## 2017-02-13 NOTE — SWALLOW BEDSIDE ASSESSMENT ADULT - SWALLOW EVAL: DIAGNOSIS
Pt presents with mild oral stage dysphagia as characterized by mildly delayed mastication and a-p transfer of bolus.  Overall skills judged to be WFL for the mechanical soft texture.   No signs or symptoms of laryngeal penetration/aspiration evident with any consistency presented.  Pharyngeal swallow judged to be timely with adequate laryngeal elevation.   Slight Left sided facial weakness persists.

## 2017-02-13 NOTE — SWALLOW BEDSIDE ASSESSMENT ADULT - ASR SWALLOW ASPIRATION MONITOR
fever/pneumonia/throat clearing/gurgly voice/change of breathing pattern/cough/upper respiratory infection/*If evident, report to MD immediately and d/c oral diet.
fever/pneumonia/upper respiratory infection/change of breathing pattern/throat clearing/gurgly voice/cough/*If evident, report to MD immediately and d/c oral diet.
cough/pneumonia/upper respiratory infection/*If evident, report to MD immediately and d/c oral diet./fever/change of breathing pattern/throat clearing/gurgly voice

## 2017-02-13 NOTE — SWALLOW BEDSIDE ASSESSMENT ADULT - ORAL PHASE
Within functional limits Within functional limits/Delayed oral transit time/mild; Pt spontaneously took sips of thin liquids to assist with oral clearing./Decreased anterior-posterior movement of the bolus

## 2017-02-13 NOTE — SWALLOW BEDSIDE ASSESSMENT ADULT - SPECIFY REASON(S)
to subjectively assess swallowing skills and r/o dysphagia.

## 2017-02-13 NOTE — SWALLOW BEDSIDE ASSESSMENT ADULT - COMMENTS
NIHSS-15, MRS-0, ICH score on admission- 1; on neurological exam she has significant expressive aphasia she is able to follow simple commands, she makes several paraphasic errors speech is fluent however nonsensical. on motor exam she has right hemiparesis facial droop.  2/4/17 CT Head: IMPRESSION: No significant change in the left cerebral hemisphere intraparenchymal hemorrhage with surrounding edema and mass effect resulting in a stable rightward midline shift.  MRI IMPRESSION: Redemonstration of left inferior frontotemporal parietal/superior temporal intraparenchymal hemorrhage with surrounding vasogenic edema and 3 mm of left-to-right midline shift. Impression: Nontraumatic left hemispheric basal ganglia hemorrhage, unclear etiology ? Hypertension; however needs complete workup including MRI brain with and without contrast to rule out vascular lesions..
NIHSS-15, MRS-0, ICH score on admission- 1; on neurological exam she has significant expressive aphasia she is able to follow simple commands, she makes several paraphasic errors speech is fluent however nonsensical. on motor exam she has right hemiparesis facial droop.  2/4/17 CT Head: IMPRESSION: No significant change in the left cerebral hemisphere intraparenchymal hemorrhage with surrounding edema and mass effect resulting in a stable rightward midline shift.  MRI IMPRESSION: Redemonstration of left inferior frontotemporal parietal/superior temporal intraparenchymal hemorrhage with surrounding vasogenic edema and 3 mm of left-to-right midline shift. Impression: Nontraumatic left hemispheric basal ganglia hemorrhage, unclear etiology ? Hypertension; however needs complete workup including MRI brain with and without contrast to rule out vascular lesions.
NIHSS-15, MRS-0, ICH score on admission- 1; on neurological exam she has significant expressive aphasia she is able to follow simple commands, she makes several paraphasic errors speech is fluent however nonsensical. on motor exam she has right hemiparesis facial droop.  2/4/17 CT Head: IMPRESSION: No significant change in the left cerebral hemisphere intraparenchymal hemorrhage with surrounding edema and mass effect resulting in a stable rightward midline shift.  MRI IMPRESSION: Redemonstration of left inferior frontotemporal parietal/superior temporal intraparenchymal hemorrhage with surrounding vasogenic edema and 3 mm of left-to-right midline shift. Impression: Nontraumatic left hemispheric basal ganglia hemorrhage, unclear etiology ? Hypertension; however needs complete workup including MRI brain with and without contrast to rule out vascular lesions.

## 2017-02-13 NOTE — SWALLOW BEDSIDE ASSESSMENT ADULT - PHARYNGEAL PHASE
No signs or symptoms of laryngeal penetration/aspiration evident with any consistency presented.  Pharyngeal swallow judged to be timely ; clear vocal quality post swallow. No signs or symptoms of laryngeal penetration/aspiration evident with any consistency presented.  Pharyngeal swallow judged to be timely with adequate laryngeal elevation.

## 2017-02-13 NOTE — SWALLOW BEDSIDE ASSESSMENT ADULT - ORAL PREPARATORY PHASE
adequate labial seal/Within functional limits Within functional limits/impulsive with cues to take small bites

## 2017-02-13 NOTE — SWALLOW BEDSIDE ASSESSMENT ADULT - ASPIRATION PRECAUTIONS
Maintain aspiration precautions./yes

## 2017-02-13 NOTE — SWALLOW BEDSIDE ASSESSMENT ADULT - SLP PERTINENT HISTORY OF CURRENT PROBLEM
Ms. Hartman is a 51 year old woman no known PMH transferred from [facility] with a Left basal ganglia hemorrhage. As per coworker at bedside, patient came to work today and was found to have slurred speech  and was not making sense. When she arrived at [facility] she was found to have Rt sided weakness. Was placed on a cardiene drip for SBP >200. No recent trauma reported. Coworker states that patient normally speaks fluent English but is now not making sense.
Ms. Hartman is a 51 year old woman no known PMH transferred from [facility] with a Left basal ganglia hemorrhage. As per coworker at bedside, patient came to work today and was found to have slurred speech  and was not making sense. When she arrived at [facility] she was found to have Rt sided weakness. Was placed on a cardiene drip for SBP >200. No recent trauma reported. Coworker states that patient normally speaks fluent English but is now not making sense.
Ms. Hartman is a 51 year old woman no known PMH transferred from [facility] with a Left basal ganglia hemorrhage. As per coworker at bedside, patient came to work today and was found to have slurred speech  and was not making sense. When she arrived at [facility] she was found to have Rt sided weakness. Was placed on a cardiene drip for SBP >200. No recent trauma reported. Coworker states that patient normally speaks fluent English but is now not making sense.

## 2017-02-13 NOTE — SWALLOW BEDSIDE ASSESSMENT ADULT - ASR SWALLOW LABIAL MOBILITY
impaired coordination/R>L/impaired pursing/impaired retraction/impaired seal
impaired pursing/impaired coordination/R>L/impaired retraction/impaired seal
impaired seal/impaired retraction/impaired pursing/on Right/impaired coordination

## 2017-02-13 NOTE — SWALLOW BEDSIDE ASSESSMENT ADULT - SWALLOW EVAL: RECOMMENDED FEEDING/EATING TECHNIQUES
maintain upright posture during/after eating for 30 mins
small sips/bites/position upright (90 degrees)
small sips/bites/maintain upright posture during/after eating for 30 mins/no straws/position upright (90 degrees)/check mouth frequently for oral residue/pocketing

## 2017-02-13 NOTE — SWALLOW BEDSIDE ASSESSMENT ADULT - SWALLOW EVAL: FEEDING ASSISTANCE
no assistance needed
frequent cues/help required/redirection to meal with reinforcement of aspiration precautions (small sips/bites; reduced rate of intake; clear mouth prior to next intake.)
frequent cues/help required

## 2017-02-14 PROCEDURE — 99233 SBSQ HOSP IP/OBS HIGH 50: CPT

## 2017-02-14 RX ADMIN — Medication 12.5 MILLIGRAM(S): at 18:54

## 2017-02-14 RX ADMIN — ENOXAPARIN SODIUM 40 MILLIGRAM(S): 100 INJECTION SUBCUTANEOUS at 13:39

## 2017-02-14 RX ADMIN — LISINOPRIL 5 MILLIGRAM(S): 2.5 TABLET ORAL at 05:38

## 2017-02-14 RX ADMIN — Medication 12.5 MILLIGRAM(S): at 05:38

## 2017-02-15 PROCEDURE — 99233 SBSQ HOSP IP/OBS HIGH 50: CPT

## 2017-02-15 RX ADMIN — LISINOPRIL 5 MILLIGRAM(S): 2.5 TABLET ORAL at 05:11

## 2017-02-15 RX ADMIN — Medication 12.5 MILLIGRAM(S): at 05:11

## 2017-02-15 RX ADMIN — Medication 12.5 MILLIGRAM(S): at 17:15

## 2017-02-15 RX ADMIN — ENOXAPARIN SODIUM 40 MILLIGRAM(S): 100 INJECTION SUBCUTANEOUS at 13:33

## 2017-02-16 PROCEDURE — 99233 SBSQ HOSP IP/OBS HIGH 50: CPT

## 2017-02-16 RX ADMIN — Medication 12.5 MILLIGRAM(S): at 06:47

## 2017-02-16 RX ADMIN — LISINOPRIL 5 MILLIGRAM(S): 2.5 TABLET ORAL at 06:47

## 2017-02-16 RX ADMIN — Medication 12.5 MILLIGRAM(S): at 19:16

## 2017-02-16 RX ADMIN — ENOXAPARIN SODIUM 40 MILLIGRAM(S): 100 INJECTION SUBCUTANEOUS at 11:16

## 2017-02-17 PROCEDURE — 99233 SBSQ HOSP IP/OBS HIGH 50: CPT

## 2017-02-17 RX ADMIN — LISINOPRIL 5 MILLIGRAM(S): 2.5 TABLET ORAL at 05:55

## 2017-02-17 RX ADMIN — Medication 12.5 MILLIGRAM(S): at 18:39

## 2017-02-17 RX ADMIN — ENOXAPARIN SODIUM 40 MILLIGRAM(S): 100 INJECTION SUBCUTANEOUS at 11:53

## 2017-02-17 NOTE — STUDENT SIGN OFF DOCUMENT - DOCUMENTS STUDENTS ARE SIGNED OFF ON
Assessment and Intervention/Vital Signs/Plan of Care
Assessment and Intervention/Plan of Care
Plan of Care/Assessment and Intervention

## 2017-02-18 VITALS
OXYGEN SATURATION: 96 % | DIASTOLIC BLOOD PRESSURE: 77 MMHG | RESPIRATION RATE: 18 BRPM | SYSTOLIC BLOOD PRESSURE: 120 MMHG | TEMPERATURE: 98 F | HEART RATE: 63 BPM

## 2017-02-18 RX ADMIN — Medication 12.5 MILLIGRAM(S): at 05:52

## 2017-02-18 RX ADMIN — LISINOPRIL 5 MILLIGRAM(S): 2.5 TABLET ORAL at 05:52

## 2017-03-12 PROCEDURE — 82947 ASSAY GLUCOSE BLOOD QUANT: CPT

## 2017-03-12 PROCEDURE — 83036 HEMOGLOBIN GLYCOSYLATED A1C: CPT

## 2017-03-12 PROCEDURE — 97110 THERAPEUTIC EXERCISES: CPT

## 2017-03-12 PROCEDURE — 80061 LIPID PANEL: CPT

## 2017-03-12 PROCEDURE — 84132 ASSAY OF SERUM POTASSIUM: CPT

## 2017-03-12 PROCEDURE — 70553 MRI BRAIN STEM W/O & W/DYE: CPT

## 2017-03-12 PROCEDURE — 85014 HEMATOCRIT: CPT

## 2017-03-12 PROCEDURE — 83605 ASSAY OF LACTIC ACID: CPT

## 2017-03-12 PROCEDURE — 70450 CT HEAD/BRAIN W/O DYE: CPT

## 2017-03-12 PROCEDURE — 97165 OT EVAL LOW COMPLEX 30 MIN: CPT

## 2017-03-12 PROCEDURE — 92610 EVALUATE SWALLOWING FUNCTION: CPT

## 2017-03-12 PROCEDURE — 85730 THROMBOPLASTIN TIME PARTIAL: CPT

## 2017-03-12 PROCEDURE — 93005 ELECTROCARDIOGRAM TRACING: CPT

## 2017-03-12 PROCEDURE — 92523 SPEECH SOUND LANG COMPREHEN: CPT

## 2017-03-12 PROCEDURE — 80048 BASIC METABOLIC PNL TOTAL CA: CPT

## 2017-03-12 PROCEDURE — 96375 TX/PRO/DX INJ NEW DRUG ADDON: CPT | Mod: XU

## 2017-03-12 PROCEDURE — 70551 MRI BRAIN STEM W/O DYE: CPT

## 2017-03-12 PROCEDURE — 97530 THERAPEUTIC ACTIVITIES: CPT

## 2017-03-12 PROCEDURE — 85027 COMPLETE CBC AUTOMATED: CPT

## 2017-03-12 PROCEDURE — 97161 PT EVAL LOW COMPLEX 20 MIN: CPT

## 2017-03-12 PROCEDURE — 82435 ASSAY OF BLOOD CHLORIDE: CPT

## 2017-03-12 PROCEDURE — G0515: CPT

## 2017-03-12 PROCEDURE — 99285 EMERGENCY DEPT VISIT HI MDM: CPT | Mod: 25

## 2017-03-12 PROCEDURE — A9585: CPT

## 2017-03-12 PROCEDURE — 84295 ASSAY OF SERUM SODIUM: CPT

## 2017-03-12 PROCEDURE — 82803 BLOOD GASES ANY COMBINATION: CPT

## 2017-03-12 PROCEDURE — 97116 GAIT TRAINING THERAPY: CPT

## 2017-03-12 PROCEDURE — 93306 TTE W/DOPPLER COMPLETE: CPT

## 2017-03-12 PROCEDURE — 85610 PROTHROMBIN TIME: CPT

## 2017-03-12 PROCEDURE — 81001 URINALYSIS AUTO W/SCOPE: CPT

## 2017-03-12 PROCEDURE — 70496 CT ANGIOGRAPHY HEAD: CPT

## 2017-03-12 PROCEDURE — 82330 ASSAY OF CALCIUM: CPT

## 2017-03-12 PROCEDURE — 96374 THER/PROPH/DIAG INJ IV PUSH: CPT | Mod: XU

## 2017-03-12 PROCEDURE — 80053 COMPREHEN METABOLIC PANEL: CPT
